# Patient Record
Sex: MALE | Race: WHITE | Employment: PART TIME | URBAN - METROPOLITAN AREA
[De-identification: names, ages, dates, MRNs, and addresses within clinical notes are randomized per-mention and may not be internally consistent; named-entity substitution may affect disease eponyms.]

---

## 2020-01-19 ENCOUNTER — APPOINTMENT (OUTPATIENT)
Dept: GENERAL RADIOLOGY | Age: 20
End: 2020-01-19
Payer: COMMERCIAL

## 2020-01-19 ENCOUNTER — HOSPITAL ENCOUNTER (EMERGENCY)
Age: 20
Discharge: HOME | End: 2020-01-19
Attending: EMERGENCY MEDICINE
Payer: COMMERCIAL

## 2020-01-19 VITALS
HEIGHT: 73 IN | TEMPERATURE: 98.1 F | OXYGEN SATURATION: 99 % | HEART RATE: 82 BPM | SYSTOLIC BLOOD PRESSURE: 154 MMHG | DIASTOLIC BLOOD PRESSURE: 87 MMHG | WEIGHT: 165 LBS | BODY MASS INDEX: 21.87 KG/M2 | RESPIRATION RATE: 14 BRPM

## 2020-01-19 PROCEDURE — 99283 EMERGENCY DEPT VISIT LOW MDM: CPT

## 2020-01-19 PROCEDURE — 73630 X-RAY EXAM OF FOOT: CPT

## 2020-01-19 PROCEDURE — 6370000000 HC RX 637 (ALT 250 FOR IP): Performed by: EMERGENCY MEDICINE

## 2020-01-19 RX ORDER — OXYCODONE HYDROCHLORIDE AND ACETAMINOPHEN 5; 325 MG/1; MG/1
1 TABLET ORAL ONCE
Status: COMPLETED | OUTPATIENT
Start: 2020-01-19 | End: 2020-01-19

## 2020-01-19 RX ORDER — ONDANSETRON 4 MG/1
4 TABLET, ORALLY DISINTEGRATING ORAL ONCE
Status: COMPLETED | OUTPATIENT
Start: 2020-01-19 | End: 2020-01-19

## 2020-01-19 RX ORDER — OXYCODONE HYDROCHLORIDE AND ACETAMINOPHEN 5; 325 MG/1; MG/1
1 TABLET ORAL EVERY 6 HOURS PRN
Qty: 5 TABLET | Refills: 0 | Status: SHIPPED | OUTPATIENT
Start: 2020-01-19 | End: 2020-01-22

## 2020-01-19 RX ADMIN — ONDANSETRON 4 MG: 4 TABLET, ORALLY DISINTEGRATING ORAL at 21:33

## 2020-01-19 RX ADMIN — OXYCODONE HYDROCHLORIDE AND ACETAMINOPHEN 1 TABLET: 5; 325 TABLET ORAL at 21:33

## 2020-01-19 ASSESSMENT — PAIN DESCRIPTION - ORIENTATION: ORIENTATION: RIGHT

## 2020-01-19 ASSESSMENT — PAIN DESCRIPTION - ONSET: ONSET: SUDDEN

## 2020-01-19 ASSESSMENT — PAIN DESCRIPTION - LOCATION: LOCATION: FOOT

## 2020-01-19 ASSESSMENT — PAIN DESCRIPTION - FREQUENCY: FREQUENCY: CONTINUOUS

## 2020-01-19 ASSESSMENT — PAIN DESCRIPTION - PAIN TYPE: TYPE: ACUTE PAIN

## 2020-01-19 ASSESSMENT — PAIN DESCRIPTION - DESCRIPTORS: DESCRIPTORS: THROBBING

## 2020-01-19 ASSESSMENT — PAIN SCALES - GENERAL
PAINLEVEL_OUTOF10: 6
PAINLEVEL_OUTOF10: 6

## 2020-01-20 NOTE — ED NOTES
Pt DC home in good condition with RX x__1__ with referral for Podiatrist. V/u of Dc instructions. Denies questions or concerns. Teaching done re: s/s to report.      Casandra Hui RN  01/19/20 7530

## 2020-01-20 NOTE — ED NOTES
Nurse entered chart to clarify a prescription for Percocet dosage and quantity. No further intervention needed.       Real Delay, RN  01/20/20 4860

## 2021-02-11 ENCOUNTER — HOSPITAL ENCOUNTER (EMERGENCY)
Age: 21
Discharge: HOME | End: 2021-02-11
Attending: EMERGENCY MEDICINE
Payer: COMMERCIAL

## 2021-02-11 VITALS
TEMPERATURE: 99.6 F | WEIGHT: 170 LBS | DIASTOLIC BLOOD PRESSURE: 87 MMHG | HEIGHT: 73 IN | BODY MASS INDEX: 22.53 KG/M2 | HEART RATE: 123 BPM | RESPIRATION RATE: 18 BRPM | OXYGEN SATURATION: 98 % | SYSTOLIC BLOOD PRESSURE: 137 MMHG

## 2021-02-11 DIAGNOSIS — L03.119 CELLULITIS AND ABSCESS OF LEG: Primary | ICD-10-CM

## 2021-02-11 DIAGNOSIS — L02.419 CELLULITIS AND ABSCESS OF LEG: Primary | ICD-10-CM

## 2021-02-11 PROCEDURE — 6370000000 HC RX 637 (ALT 250 FOR IP): Performed by: EMERGENCY MEDICINE

## 2021-02-11 PROCEDURE — 10060 I&D ABSCESS SIMPLE/SINGLE: CPT

## 2021-02-11 PROCEDURE — 99284 EMERGENCY DEPT VISIT MOD MDM: CPT

## 2021-02-11 RX ORDER — OXYCODONE HYDROCHLORIDE AND ACETAMINOPHEN 5; 325 MG/1; MG/1
1 TABLET ORAL ONCE
Status: COMPLETED | OUTPATIENT
Start: 2021-02-11 | End: 2021-02-11

## 2021-02-11 RX ORDER — CLINDAMYCIN HYDROCHLORIDE 150 MG/1
450 CAPSULE ORAL 3 TIMES DAILY
Qty: 90 CAPSULE | Refills: 0 | Status: SHIPPED | OUTPATIENT
Start: 2021-02-11 | End: 2021-02-21

## 2021-02-11 RX ORDER — CLINDAMYCIN HYDROCHLORIDE 150 MG/1
450 CAPSULE ORAL ONCE
Status: COMPLETED | OUTPATIENT
Start: 2021-02-11 | End: 2021-02-11

## 2021-02-11 RX ADMIN — CLINDAMYCIN HYDROCHLORIDE 450 MG: 150 CAPSULE ORAL at 22:31

## 2021-02-11 RX ADMIN — OXYCODONE HYDROCHLORIDE AND ACETAMINOPHEN 1 TABLET: 5; 325 TABLET ORAL at 22:31

## 2021-02-11 ASSESSMENT — PAIN DESCRIPTION - PAIN TYPE: TYPE: ACUTE PAIN

## 2021-02-11 ASSESSMENT — PAIN DESCRIPTION - ORIENTATION: ORIENTATION: LEFT;UPPER

## 2021-02-11 ASSESSMENT — PAIN DESCRIPTION - LOCATION: LOCATION: LEG

## 2021-02-11 ASSESSMENT — PAIN SCALES - GENERAL: PAINLEVEL_OUTOF10: 8

## 2021-02-12 NOTE — ED PROVIDER NOTES
[facility] EMERGENCY DEPARTMENT      CHIEF COMPLAINT  Abscess (posterior upper left leg)       HISTORY OF PRESENT ILLNESS  Keanu Hatch is a 23 y.o. male  who presents to the ED complaining of abscess to left leg. He states that it started as a small red area a week ago, and has grown in size. He states that there has not been any drainage. He has been using Epsom salts and other topical ointments without any relief. He states the redness is just worsened. He denies any streaking. He has not taken anything for pain. He denies fever, chills, vomiting, or body aches. Denies any other complaints today. No other complaints, modifying factors or associated symptoms. I have reviewed the following from the nursing documentation. History reviewed. No pertinent past medical history. History reviewed. No pertinent surgical history. History reviewed. No pertinent family history.   Social History     Socioeconomic History    Marital status: Single     Spouse name: Not on file    Number of children: Not on file    Years of education: Not on file    Highest education level: Not on file   Occupational History    Not on file   Social Needs    Financial resource strain: Not on file    Food insecurity     Worry: Not on file     Inability: Not on file    Transportation needs     Medical: Not on file     Non-medical: Not on file   Tobacco Use    Smoking status: Current Every Day Smoker     Packs/day: 1.00     Types: Cigarettes    Smokeless tobacco: Former User   Substance and Sexual Activity    Alcohol use: Not Currently    Drug use: Never    Sexual activity: Not on file   Lifestyle    Physical activity     Days per week: Not on file     Minutes per session: Not on file    Stress: Not on file   Relationships    Social connections     Talks on phone: Not on file     Gets together: Not on file     Attends Mandaen service: Not on file     Active member of club or organization: Not on file Attends meetings of clubs or organizations: Not on file     Relationship status: Not on file    Intimate partner violence     Fear of current or ex partner: Not on file     Emotionally abused: Not on file     Physically abused: Not on file     Forced sexual activity: Not on file   Other Topics Concern    Not on file   Social History Narrative    Not on file     No current facility-administered medications for this encounter. Current Outpatient Medications   Medication Sig Dispense Refill    clindamycin (CLEOCIN) 150 MG capsule Take 3 capsules by mouth 3 times daily for 10 days 90 capsule 0     Allergies   Allergen Reactions    Ibuprofen Swelling    Vicodin [Hydrocodone-Acetaminophen] Swelling       REVIEW OF SYSTEMS  10 systems reviewed, pertinent positives per HPI otherwise noted to be negative. PHYSICAL EXAM  /87   Pulse 123   Temp 99.6 °F (37.6 °C) (Oral)   Resp 18   Ht 6' 1\" (1.854 m)   Wt 170 lb (77.1 kg)   SpO2 98%   BMI 22.43 kg/m²    Physical exam:  General appearance: awake and cooperative. No distress. Non toxic appearing. Skin: posterior upper leg just inferior to gluteal fold shows a [id number] area of fluctuance with surrounding erythema and induration; no perineal involvement; no streaking; no crepitus. Skin Warm and dry. No rashes. HENT: Normocephalic. Atraumatic. Neck: supple  Eyes: ELIDIA. EOM intact. Heart: Tachycardic rate. Regular rhythm. No murmurs. Lungs: Respirations unlabored. CTAB. No wheezes, rales, or rhonchi. Good air exchange  Abdomen: No tenderness. Soft. Non distended. No peritoneal signs. Musculoskeletal: No extremity edema. Compartments soft. No deformity. No tenderness in the extremities. All extremities neurovascularly intact. Radial, Dp, and PT pulses +2/4 bilaterally  Neurological: Alert and oriented. No focal deficits. No aphasia or dysarthria. No gait ataxia. Psychiatric: Normal mood and affect.        LABS  I have reviewed all labs for this visit. No results found for this visit on 02/11/21. ECG      RADIOLOGY  No results found. ED COURSE/MDM  Patient seen and evaluated. Old records reviewed. Labs and imaging reviewed and results discussed with patient. Patient is a 18-year-old male presenting with an abscess. He is tachycardic around 120 on arrival, states his heart rate is \"always high. \"  He looks well on exam, I do not suspect sepsis or dangerous etiology of tachycardia at this time. The patient does have an abscess with surrounding cellulitis posterior thigh. No evidence of necrotizing fasciitis, he is neurovascularly intact as well. I do not feel blood work is indicated today. ED abscess is incised and drained with good expression of purulent material.  The area is packed, patient is placed on clindamycin for antibiotic coverage. He is told to follow-up with primary care within the next 48 hours. I also did discuss symptomatic care instruction for the abscess at home, and we also discussed return precautions back here at length. He voices understanding. PROCEDURE:  INCISION & DRAINAGE  Roe Vasques or their surrogate had an opportunity to ask questions, and the risks, benefits, and alternatives were discussed. The abscess was prepped and draped to maintain a sterile field. A local anesthetic was used to anesthetize the abscess. A linear incision was made to keep the abscess open so it will continue to drain. It was copiously irrigated with its loculations broken down. There was 4-5 cc of purulent fluid. There were no complications during the procedure. During the patient's ED course, the patient was given:  Medications   clindamycin (CLEOCIN) capsule 450 mg (450 mg Oral Given 2/11/21 2231)   oxyCODONE-acetaminophen (PERCOCET) 5-325 MG per tablet 1 tablet (1 tablet Oral Given 2/11/21 2231)        CLINICAL IMPRESSION  1.  Cellulitis and abscess of leg        Blood pressure 137/87, pulse 123, temperature 99.6 °F (37.6 °C), temperature source Oral, resp. rate 18, height 6' 1\" (1.854 m), weight 170 lb (77.1 kg), SpO2 98 %. Patient was given scripts for the following medications. I counseled patient how to take these medications. Discharge Medication List as of 2/11/2021 10:29 PM      START taking these medications    Details   clindamycin (CLEOCIN) 150 MG capsule Take 3 capsules by mouth 3 times daily for 10 days, Disp-90 capsule, R-0Print             Follow-up with:  your pcp    Call in 1 day        DISCLAIMER: This chart was created using Dragon dictation software. Efforts were made by me to ensure accuracy, however some errors may be present due to limitations of this technology and occasionally words are not transcribed correctly.        Andry Rothman  02/12/21 0013

## 2021-02-12 NOTE — ED TRIAGE NOTES
Pt has large abscess area to inner, upper, left thigh. Present approximately one week, has tried OTC cream and epsom salt baths without relief.

## 2022-09-09 PROCEDURE — 99284 EMERGENCY DEPT VISIT MOD MDM: CPT

## 2022-09-09 PROCEDURE — 96372 THER/PROPH/DIAG INJ SC/IM: CPT

## 2022-09-09 PROCEDURE — 64450 NJX AA&/STRD OTHER PN/BRANCH: CPT

## 2022-09-09 ASSESSMENT — PAIN SCALES - GENERAL: PAINLEVEL_OUTOF10: 10

## 2022-09-09 ASSESSMENT — PAIN DESCRIPTION - ORIENTATION: ORIENTATION: RIGHT;UPPER;POSTERIOR

## 2022-09-10 ENCOUNTER — HOSPITAL ENCOUNTER (EMERGENCY)
Age: 22
Discharge: HOME | End: 2022-09-10
Attending: EMERGENCY MEDICINE
Payer: COMMERCIAL

## 2022-09-10 VITALS
SYSTOLIC BLOOD PRESSURE: 122 MMHG | WEIGHT: 170 LBS | TEMPERATURE: 97.7 F | OXYGEN SATURATION: 97 % | BODY MASS INDEX: 22.53 KG/M2 | HEART RATE: 75 BPM | DIASTOLIC BLOOD PRESSURE: 70 MMHG | RESPIRATION RATE: 18 BRPM | HEIGHT: 73 IN

## 2022-09-10 DIAGNOSIS — K02.9 DENTAL CARIES: ICD-10-CM

## 2022-09-10 DIAGNOSIS — K08.89 PAIN, DENTAL: Primary | ICD-10-CM

## 2022-09-10 PROCEDURE — 6360000002 HC RX W HCPCS: Performed by: EMERGENCY MEDICINE

## 2022-09-10 PROCEDURE — 64450 NJX AA&/STRD OTHER PN/BRANCH: CPT

## 2022-09-10 PROCEDURE — 96372 THER/PROPH/DIAG INJ SC/IM: CPT

## 2022-09-10 PROCEDURE — 6370000000 HC RX 637 (ALT 250 FOR IP): Performed by: EMERGENCY MEDICINE

## 2022-09-10 RX ORDER — CLINDAMYCIN HYDROCHLORIDE 150 MG/1
300 CAPSULE ORAL ONCE
Status: COMPLETED | OUTPATIENT
Start: 2022-09-10 | End: 2022-09-10

## 2022-09-10 RX ORDER — KETOROLAC TROMETHAMINE 30 MG/ML
15 INJECTION, SOLUTION INTRAMUSCULAR; INTRAVENOUS ONCE
Status: COMPLETED | OUTPATIENT
Start: 2022-09-10 | End: 2022-09-10

## 2022-09-10 RX ORDER — CLINDAMYCIN HYDROCHLORIDE 300 MG/1
300 CAPSULE ORAL 3 TIMES DAILY
Qty: 30 CAPSULE | Refills: 0 | Status: SHIPPED | OUTPATIENT
Start: 2022-09-10 | End: 2022-09-20

## 2022-09-10 RX ADMIN — KETOROLAC TROMETHAMINE 15 MG: 30 INJECTION, SOLUTION INTRAMUSCULAR; INTRAVENOUS at 01:19

## 2022-09-10 RX ADMIN — CLINDAMYCIN HYDROCHLORIDE 300 MG: 150 CAPSULE ORAL at 01:20

## 2022-09-10 NOTE — DISCHARGE INSTRUCTIONS
Follow-up with a dentist as soon as possible  We performed a dental block, injected numbing agent over the tooth. Return to the ER for any difficulty breathing or swallowing, fever or neck swelling, or any other concerns.

## 2022-09-10 NOTE — ED PROVIDER NOTES
Emergency Department Physician Note     Location: [place] [address] ED  9/9/2022    CHIEF COMPLAINT  Dental Pain (States dental pain for approx 7 months. Has had multiple rounds of antibiotics, and has missed dental appointments)      [facility]  Eliezer Jha is a 20 y.o. male presents to the ED with dental pain, right upper molar, has not been able to get into a dentist, has been having issues on and off for about 7 months, had antibiotics just couple weeks ago, did not seem to fully resolve, was on penicillin, missed dentist appointments, he is here in the ER concern for infection again, no new dental injury that he is aware of, no history of diabetes or immune dysfunction, no chest pain or shortness of breath, no fevers, no difficulty moving the neck, no difficulty breathing or swallowing. He is a smoker, no other complaints, modifying factors or associated symptoms. I have reviewed the following from the nursing documentation. History reviewed. No pertinent past medical history. History reviewed. No pertinent surgical history. History reviewed. No pertinent family history.   Social History     Socioeconomic History    Marital status: Single     Spouse name: Not on file    Number of children: Not on file    Years of education: Not on file    Highest education level: Not on file   Occupational History    Not on file   Tobacco Use    Smoking status: Every Day     Packs/day: 0.50     Types: Cigarettes    Smokeless tobacco: Former   Vaping Use    Vaping Use: Never used   Substance and Sexual Activity    Alcohol use: Not Currently    Drug use: Never    Sexual activity: Not on file   Other Topics Concern    Not on file   Social History Narrative    Not on file     Social Determinants of Health     Financial Resource Strain: Not on file   Food Insecurity: Not on file   Transportation Needs: Not on file   Physical Activity: Not on file   Stress: Not on file   Social Connections: Not on file   Intimate Partner Violence: Not on file   Housing Stability: Not on file     No current facility-administered medications for this encounter. Current Outpatient Medications   Medication Sig Dispense Refill    clindamycin (CLEOCIN) 300 MG capsule Take 1 capsule by mouth 3 times daily for 10 days 30 capsule 0     Allergies   Allergen Reactions    Ibuprofen Swelling    Vicodin [Hydrocodone-Acetaminophen] Swelling       REVIEW OF SYSTEMS  10 systems reviewed, pertinent positives per HPI otherwise noted to be negative. PHYSICAL EXAM   /70   Pulse 75   Temp 97.7 °F (36.5 °C)   Resp 18   Ht 6' 1\" (1.854 m)   Wt 170 lb (77.1 kg)   SpO2 97%   BMI 22.43 kg/m²   GENERAL APPEARANCE: Awake and alert. Cooperative. No acute distress  HEAD: Normocephalic. Atraumatic. No rice's sign. EYES: PERRL. EOM's grossly intact. No scleral icterus. No drainage. No periorbital ecchymosis. ENT: Mucous membranes are moist. Airway patent. No stridor. No epistaxis. No otorrhea or rhinorrhea. Right upper most posterior molar with dental caries, tenderness to palpation, no visible superficial abscess, minimal edema at gumline, no other oral ulcers/lesions, no exudates, midline uvula, multiple dental caries  NECK: Supple. No rigidity, trachea midline, no adenopathy, no edema, nml ROM  HEART: RRR. No murmurs/gallups/rubs  LUNGS: Respirations unlabored, Lungs are clear to ausculation bilaterally, no wheezes/crackles/rhonchi   ABDOMEN: Soft. Non-distended. Non-tender. No guarding, no rebound tenderness, no rigidity. EXTREMITIES: No peripheral edema. Moves all extremities equally. No obvious deformities. SKIN: Warm and dry. No acute rashes. NEUROLOGICAL: Alert and oriented x4. No gross facial drooping. Normal speech, steady gait  PSYCHIATRIC: Normal mood and affect.       PROCEDURE:  DENTAL BLOCK  Edvin Hill or their surrogate had an opportunity to ask questions, and the risks, benefits, and alternatives were discussed. The injection site was prepped to maintain a sterile field. A local anesthetic was used to completely anesthetize the periapical nerve of the right upper most posterior molar, wisdom tooth. There were no complications during the procedure. Nida Mirza had excellent pain relief. I am the primary clinician of record. ED COURSE/MDM  Patient seen and evaluated. Old records reviewed. Labs and imaging reviewed and results discussed with patient.     16[address] [facility] y.o. male with dental pain, strongly encouraged dentist follow-up, given a few options on discharge paperwork normal vitals, afebrile nontoxic-appearing, he did accept a dental block, given primary care follow-up, given Toradol for pain as well, encouraged to take Naprosyn or Tylenol as needed at home, initiated antibiotics again, did not see a visible abscess able to be drained at this time, no current concern for Michael's angina or sepsis, no concern for airway compromise, encourage smoking cessation, strict return precautions given, all questions answered, will return if any worsening symptoms or new concerns, see AVS for further discharge information, patient verbalized understanding of plan, felt comfortable going home. Orders Placed This Encounter   Procedures    Referral for No Primary Care Physician - Urgent     Orders Placed This Encounter   Medications    clindamycin (CLEOCIN) capsule 300 mg     Order Specific Question:   Antimicrobial Indications     Answer:   Head and Neck Infection    ketorolac (TORADOL) injection 15 mg    clindamycin (CLEOCIN) 300 MG capsule     Sig: Take 1 capsule by mouth 3 times daily for 10 days     Dispense:  30 capsule     Refill:  0            CLINICAL IMPRESSION  1. Pain, dental    2. Dental caries        Blood pressure 122/70, pulse 75, temperature 97.7 °F (36.5 °C), resp. rate 18, height 6' 1\" (1.854 m), weight 170 lb (77.1 kg), SpO2 97 %.     DISPOSITION  Nida Mirza was discharged to home in stable condition.                   Skylar Acharya, DO  09/14/22 5256